# Patient Record
Sex: MALE | Race: WHITE | NOT HISPANIC OR LATINO | Employment: UNEMPLOYED | URBAN - METROPOLITAN AREA
[De-identification: names, ages, dates, MRNs, and addresses within clinical notes are randomized per-mention and may not be internally consistent; named-entity substitution may affect disease eponyms.]

---

## 2023-04-24 DIAGNOSIS — F17.200 NEEDS SMOKING CESSATION EDUCATION: ICD-10-CM

## 2023-04-24 PROBLEM — I82.419 ACUTE DEEP VEIN THROMBOSIS (DVT) OF FEMORAL VEIN: Status: ACTIVE | Noted: 2023-04-24

## 2023-04-24 PROBLEM — I10 PRIMARY HYPERTENSION: Status: ACTIVE | Noted: 2023-04-24

## 2024-01-30 ENCOUNTER — PATIENT OUTREACH (OUTPATIENT)
Dept: ADMINISTRATIVE | Facility: HOSPITAL | Age: 59
End: 2024-01-30
Payer: MEDICAID

## 2024-06-20 ENCOUNTER — PATIENT OUTREACH (OUTPATIENT)
Dept: ADMINISTRATIVE | Facility: HOSPITAL | Age: 59
End: 2024-06-20
Payer: MEDICAID

## 2025-05-22 ENCOUNTER — PATIENT OUTREACH (OUTPATIENT)
Dept: ADMINISTRATIVE | Facility: HOSPITAL | Age: 60
End: 2025-05-22
Payer: MEDICAID

## 2025-05-22 NOTE — PROGRESS NOTES
"  Portal active: Pending   Chart reviewed, immunization record updated.  No new results noted on [facility] or "[employer] web site.  Care Everywhere updated.   Smoking Cessation Program Eligibility: Eligible   Patient care coordination note  Next PCP visit (No follow up appointment is scheduled at this time)  LOV with PCP 10/11/2023   Attempted to contact patient to schedule a PCP appt, discuss a colorectal cancer screening, offer free smoking cessation services, and obtain a remote BP reading. Patient is on the [facility] BP Non-Portal Patients Gap Report. "The subscriber you have dialed is not in service."   "